# Patient Record
Sex: MALE | Race: WHITE | NOT HISPANIC OR LATINO | Employment: OTHER | URBAN - METROPOLITAN AREA
[De-identification: names, ages, dates, MRNs, and addresses within clinical notes are randomized per-mention and may not be internally consistent; named-entity substitution may affect disease eponyms.]

---

## 2019-02-22 ENCOUNTER — HOSPITAL ENCOUNTER (EMERGENCY)
Facility: HOSPITAL | Age: 44
Discharge: HOME | End: 2019-02-22
Attending: EMERGENCY MEDICINE
Payer: COMMERCIAL

## 2019-02-22 VITALS
BODY MASS INDEX: 29.66 KG/M2 | RESPIRATION RATE: 18 BRPM | SYSTOLIC BLOOD PRESSURE: 147 MMHG | HEIGHT: 67 IN | HEART RATE: 90 BPM | WEIGHT: 189 LBS | DIASTOLIC BLOOD PRESSURE: 90 MMHG | OXYGEN SATURATION: 96 % | TEMPERATURE: 98 F

## 2019-02-22 DIAGNOSIS — F43.0 ACUTE STRESS REACTION: Primary | ICD-10-CM

## 2019-02-22 PROCEDURE — 99283 EMERGENCY DEPT VISIT LOW MDM: CPT

## 2019-02-22 RX ORDER — BUSPIRONE HYDROCHLORIDE 15 MG/1
15 TABLET ORAL 3 TIMES DAILY
COMMUNITY
End: 2022-10-10

## 2019-02-22 RX ORDER — FLUTICASONE PROPIONATE 50 MCG
1 SPRAY, SUSPENSION (ML) NASAL DAILY
COMMUNITY

## 2019-02-22 NOTE — ED TRIAGE NOTES
"Pt arrived to ED via EMS after altercation with family today. Family reports that pt stated he wanted to kill himself. Pt does report stating he wanted to kill himself. He also reports suicidal thoughts occurring 4 days PTA. Pt denies SI at this time and denies a plan. He states "im am just stressed and overwhelmed". He also denies HI or any other medical complaints at this time.   "

## 2019-02-22 NOTE — DISCHARGE INSTRUCTIONS
Please follow up with counseling services as an outpatient and take all medications as prescribed.  Please return for any new or worsening symptoms such as thoughts of harming yourself, thoughts of harming others, or feelings of hopelessness.

## 2019-02-23 NOTE — ED PROVIDER NOTES
"Encounter Date: 2/22/2019    SCRIBE #1 NOTE: I, Debra Rome, am scribing for, and in the presence of,  Mateo Suarez MD. I have scribed the following portions of the note - Other sections scribed: ROS and HPI.       History     Chief Complaint   Patient presents with    Psychiatric Evaluation     Per EMs, pt was having argument with his girlfriend and mother and stated that he wanted to kill himself. Police were called and pt said the same thing to them. Upon arrival, pt denies SI     CC: Psychiatric Evaluation    HPI: This 43 y.o. male with a past medical history of Anxiety, presents to the ED for emergent psychiatric evaluation. EMS was called in by his girlfriend and mother after he reportedly made a statement to kill himself today. 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 He is having conflicts with his mother who he reports is not willing to support him financially. He also expresses severe anger towards her "alcoholic" sister because his mother is financially supporting his sister with her house mortgage. He reports culmination of things for last several months led to the outburst of his anger today.The patient reports he said to his mother, "Y'all will feel fucking stupid when you find me hanging on a tree." Denies any past hx of suicidal ideation. He currently denies SI/HI, hallucinations or any other sx. He takes Buspar for his generalized anxiety and reports compliance with it.       The history is provided by the patient. No  was used.     Review of patient's allergies indicates:  No Known Allergies  Past Medical History:   Diagnosis Date    Anxiety      History reviewed. No pertinent surgical history.  History reviewed. No pertinent family history.  Social History     Tobacco Use    Smoking status: Former Smoker    Smokeless tobacco: Never Used   Substance Use Topics    Alcohol use: No     Frequency: Never    Drug use: No "     Review of Systems   Constitutional: Negative for chills and fever.   HENT: Negative for congestion, ear pain, rhinorrhea and sore throat.    Eyes: Negative for pain and visual disturbance.   Respiratory: Negative for cough and shortness of breath.    Cardiovascular: Negative for chest pain.   Gastrointestinal: Negative for abdominal pain, diarrhea, nausea and vomiting.   Genitourinary: Negative for dysuria.   Musculoskeletal: Negative for back pain and neck pain.   Skin: Negative for rash.   Neurological: Negative for headaches.   Psychiatric/Behavioral: Negative for hallucinations and suicidal ideas.       Physical Exam     Initial Vitals [02/22/19 1634]   BP Pulse Resp Temp SpO2   (!) 140/96 90 18 98.2 °F (36.8 °C) 95 %      MAP       --         Physical Exam    Nursing note and vitals reviewed.  Constitutional: He appears well-developed and well-nourished. He is not diaphoretic. No distress.   HENT:   Head: Normocephalic and atraumatic.   Right Ear: External ear normal.   Left Ear: External ear normal.   Nose: Nose normal.   Mouth/Throat: Oropharynx is clear and moist.   Eyes: Conjunctivae and EOM are normal. Pupils are equal, round, and reactive to light. Right eye exhibits no discharge. Left eye exhibits no discharge. No scleral icterus.   Neck: Normal range of motion. Neck supple. No JVD present.   Cardiovascular: Normal rate, regular rhythm, normal heart sounds and intact distal pulses. Exam reveals no gallop and no friction rub.    No murmur heard.  Pulmonary/Chest: Breath sounds normal. No stridor. No respiratory distress. He has no wheezes. He has no rhonchi. He has no rales. He exhibits no tenderness.   Musculoskeletal: Normal range of motion. He exhibits no edema or tenderness.   Neurological: He is alert and oriented to person, place, and time. He has normal strength. No cranial nerve deficit or sensory deficit.   Skin: Skin is warm and dry. No rash noted. No erythema. No pallor.   Psychiatric: His  behavior is normal. Judgment and thought content normal.   This patient's thought process is linear.  His affect is animated.  His voice tone has normal range.  The patient is talkative but does not appear manic.  He does not appear delusional or psychotic.  He denies any suicidal ideation or homicidal ideation or attempts.         ED Course   Procedures  Labs Reviewed - No data to display       Imaging Results    None          Medical Decision Making:   ED Management:  This is the emergent evaluation of a 43-year-old male presents emergency department for psychiatric evaluation.      This appears to be a misunderstanding.  This patient denies any history of any psychiatric disease other than outpatient management of generalized anxiety disorder with buspirone that he has been on for some time.  He denies any drug or alcohol us.  Patient is expressing a lot of anger and frustration with situation and his family.  However, he has no thoughts of harming himself or plans to harm himself.  He has no thoughts of harming others.  He does not appear delusional or psychotic.  Patient states that he he had a fleeting thought of jumping into the river but was discussed with the idea afterwards and never imagine caring out this plan.  He denies any past attempts at harming himself or taking his life.  He also states that he made a comment today to his wife and his mother to get a reaction out of them regarding taking his life but states that he has no intention of doing so.  Patient is reasonable.  His affect is animated but otherwise is mental status exam is normal. I believe he is safe and stable for discharge with outpatient follow-up.  He was given resources for this.  He was advised to return if he does develop new or worsening symptoms such as loss of control, suicidal ideation, or homicidal ideation.  I do not believe that he needs to be on a 72 hr hold/PEC.            Scribe Attestation:   Scribe #1: I performed the  above scribed service and the documentation accurately describes the services I performed. I attest to the accuracy of the note.    Attending Attestation:           Physician Attestation for Scribe:  Physician Attestation Statement for Scribe #1: I, Mateo Suarez MD, reviewed documentation, as scribed by Debra Peter in my presence, and it is both accurate and complete.                    Clinical Impression:       ICD-10-CM ICD-9-CM   1. Acute stress reaction F43.0 308.9                                Mateo Suarze Jr., MD  02/22/19 2030

## 2021-04-16 ENCOUNTER — PATIENT MESSAGE (OUTPATIENT)
Dept: RESEARCH | Facility: HOSPITAL | Age: 46
End: 2021-04-16

## 2022-04-14 ENCOUNTER — OFFICE VISIT (OUTPATIENT)
Dept: PRIMARY CARE CLINIC | Facility: CLINIC | Age: 47
End: 2022-04-14
Payer: COMMERCIAL

## 2022-04-14 VITALS
DIASTOLIC BLOOD PRESSURE: 86 MMHG | HEIGHT: 67 IN | OXYGEN SATURATION: 99 % | BODY MASS INDEX: 28 KG/M2 | HEART RATE: 78 BPM | SYSTOLIC BLOOD PRESSURE: 138 MMHG | TEMPERATURE: 99 F | WEIGHT: 178.38 LBS

## 2022-04-14 DIAGNOSIS — B35.1 ONYCHOMYCOSIS: ICD-10-CM

## 2022-04-14 DIAGNOSIS — N40.0 BENIGN PROSTATIC HYPERPLASIA, UNSPECIFIED WHETHER LOWER URINARY TRACT SYMPTOMS PRESENT: ICD-10-CM

## 2022-04-14 DIAGNOSIS — F41.1 ANXIETY STATE: Primary | ICD-10-CM

## 2022-04-14 PROBLEM — J30.9 ALLERGIC RHINITIS: Status: ACTIVE | Noted: 2018-11-06

## 2022-04-14 PROCEDURE — 1159F MED LIST DOCD IN RCRD: CPT | Mod: CPTII,S$GLB,, | Performed by: INTERNAL MEDICINE

## 2022-04-14 PROCEDURE — 99999 PR PBB SHADOW E&M-EST. PATIENT-LVL III: ICD-10-PCS | Mod: PBBFAC,,, | Performed by: INTERNAL MEDICINE

## 2022-04-14 PROCEDURE — 1159F PR MEDICATION LIST DOCUMENTED IN MEDICAL RECORD: ICD-10-PCS | Mod: CPTII,S$GLB,, | Performed by: INTERNAL MEDICINE

## 2022-04-14 PROCEDURE — 3008F BODY MASS INDEX DOCD: CPT | Mod: CPTII,S$GLB,, | Performed by: INTERNAL MEDICINE

## 2022-04-14 PROCEDURE — 3079F PR MOST RECENT DIASTOLIC BLOOD PRESSURE 80-89 MM HG: ICD-10-PCS | Mod: CPTII,S$GLB,, | Performed by: INTERNAL MEDICINE

## 2022-04-14 PROCEDURE — 3079F DIAST BP 80-89 MM HG: CPT | Mod: CPTII,S$GLB,, | Performed by: INTERNAL MEDICINE

## 2022-04-14 PROCEDURE — 3075F PR MOST RECENT SYSTOLIC BLOOD PRESS GE 130-139MM HG: ICD-10-PCS | Mod: CPTII,S$GLB,, | Performed by: INTERNAL MEDICINE

## 2022-04-14 PROCEDURE — 99999 PR PBB SHADOW E&M-EST. PATIENT-LVL III: CPT | Mod: PBBFAC,,, | Performed by: INTERNAL MEDICINE

## 2022-04-14 PROCEDURE — 3075F SYST BP GE 130 - 139MM HG: CPT | Mod: CPTII,S$GLB,, | Performed by: INTERNAL MEDICINE

## 2022-04-14 PROCEDURE — 99214 OFFICE O/P EST MOD 30 MIN: CPT | Mod: S$GLB,,, | Performed by: INTERNAL MEDICINE

## 2022-04-14 PROCEDURE — 99214 PR OFFICE/OUTPT VISIT, EST, LEVL IV, 30-39 MIN: ICD-10-PCS | Mod: S$GLB,,, | Performed by: INTERNAL MEDICINE

## 2022-04-14 PROCEDURE — 3008F PR BODY MASS INDEX (BMI) DOCUMENTED: ICD-10-PCS | Mod: CPTII,S$GLB,, | Performed by: INTERNAL MEDICINE

## 2022-04-14 RX ORDER — ESCITALOPRAM OXALATE 10 MG/1
10 TABLET ORAL DAILY
COMMUNITY
Start: 2021-12-27 | End: 2022-04-14 | Stop reason: SDUPTHER

## 2022-04-14 RX ORDER — TERBINAFINE HYDROCHLORIDE 250 MG/1
250 TABLET ORAL DAILY
Qty: 42 TABLET | Refills: 0 | Status: SHIPPED | OUTPATIENT
Start: 2022-04-14 | End: 2022-05-26

## 2022-04-14 RX ORDER — TAMSULOSIN HYDROCHLORIDE 0.4 MG/1
0.4 CAPSULE ORAL DAILY
Qty: 30 CAPSULE | Refills: 5 | Status: SHIPPED | OUTPATIENT
Start: 2022-04-14 | End: 2022-10-06

## 2022-04-14 RX ORDER — ESCITALOPRAM OXALATE 10 MG/1
10 TABLET ORAL DAILY
Qty: 90 TABLET | Refills: 3 | Status: SHIPPED | OUTPATIENT
Start: 2022-04-14 | End: 2023-10-03

## 2022-04-14 NOTE — PROGRESS NOTES
[facility] Progress Note  4/14/2022          Reason for Visit:      had concerns including Follow-up, Medication Management, and Toe Pain.       History of Present Illness:     Pt is here today to reestablish primary care.  He is known to me from my previous practice.    Patient has been on Lexapro and BuSpar for anxiety for the past couple of years.  He says that these have been working very well for him.  He says that he still has some motor tics occasionally and he knows this some more when he is anxious and stressed out.  He recently ran out of Lexapro and he states that once he ran out of that he began having about 4 hours of dizziness that would happen whenever he took the BusPar.  He says that when he 1st started taking the BusPar without the Lexapro he will get dizziness for 10 or 15 minutes but this was lasting for hours or so.  It only started happening when he got off the Lexapro.  He would like to try getting back on the Lexapro.    Patient has a new complaint which is that over the past several months he has been having problems with urinary urgency.  He says when he does go to the bathroom sometimes it is difficult getting his stream started and often feels like he has not completely emptied his bladder.  He says he is having nocturia only 1 time per night but that he is going frequently to use the restroom during the day but he attributes that to increase fluid intake.    Patient also has thickening and discoloration of pretty much all of his toes.  He says it is only 2 sodium those that have been spared.  He says that this 1st began about 20 years ago.  He says it does not really bother him except that the great toe toenail on the right side is ingrowing into his skin and that is causing pain.  He would like to try treating the toenail fungus and we discussed options.  He is agreeable to try terbinafine.    We discussed that patient is due for colon cancer screening.  We are  going to put a referral in for colonoscopy but he says that because we may be out of network he needs to check with his insurance 1st.  He is going to let us know when and where we can send the referral to.    For the same reason he is also wanting to hold off on doing blood work today.  We did discuss that we do do blood work I would recommend doing a PSA because of his urinary symptoms.      Problem List:     Patient Active Problem List   Diagnosis    Allergic rhinitis    Anxiety state    Benign prostatic hyperplasia    Onychomycosis         Surgical History:     He has no past surgical history on file.      Family History:      His family history includes Epilepsy in his father.      Social History:     He reports that he has quit smoking. He has never used smokeless tobacco. He reports that he does not drink alcohol and does not use drugs.      Medications:       Current Outpatient Medications:     busPIRone (BUSPAR) 15 MG tablet, Take 15 mg by mouth 3 (three) times daily., Disp: , Rfl:     fluticasone (FLONASE) 50 mcg/actuation nasal spray, 1 spray by Each Nare route once daily., Disp: , Rfl:     levocetirizine dihydrochloride (XYZAL ORAL), Take by mouth., Disp: , Rfl:     EScitalopram oxalate (LEXAPRO) 10 MG tablet, Take 1 tablet (10 mg total) by mouth once daily., Disp: 90 tablet, Rfl: 3    tamsulosin (FLOMAX) 0.4 mg Cap, Take 1 capsule (0.4 mg total) by mouth once daily., Disp: 30 capsule, Rfl: 5    terbinafine HCL (LAMISIL) 250 mg tablet, Take 1 tablet (250 mg total) by mouth once daily., Disp: 42 tablet, Rfl: 0        Allergies:   He has No Known Allergies.      Review of Systems:     Review of Systems   Constitutional: Negative for chills and fever.   HENT: Negative for ear pain and sore throat.    Respiratory: Negative for cough, shortness of breath and wheezing.    Cardiovascular: Negative for chest pain and palpitations.   Gastrointestinal: Negative for constipation, diarrhea, nausea and  "vomiting.   Genitourinary: Positive for frequency and urgency. Negative for dysuria and hematuria.   Musculoskeletal: Negative for joint swelling and joint deformity.   Neurological: Negative for dizziness and weakness.           Physical Exam:     Temp:    99.1 °F (37.3 °C) (Oral)        Blood Pressure:  138/86        Pulse:   78     Respirations:     Weight:   80.9 kg (178 lb 5.6 oz)  Height:   5' 7" (1.702 m)  BMI:     Body mass index is 27.93 kg/m².    Physical Exam  Constitutional:       General: He is not in acute distress.  HENT:      Right Ear: Tympanic membrane normal.      Left Ear: Tympanic membrane normal.      Nose: No congestion or rhinorrhea.      Mouth/Throat:      Pharynx: No oropharyngeal exudate or posterior oropharyngeal erythema.   Cardiovascular:      Rate and Rhythm: Normal rate and regular rhythm.   Pulmonary:      Effort: Pulmonary effort is normal. No respiratory distress.      Breath sounds: No wheezing.   Abdominal:      Palpations: Abdomen is soft.      Tenderness: There is no abdominal tenderness.   Genitourinary:     Comments: Prostate mildly enlarged, symmetric, no nodules noted.   Skin:     General: Skin is warm.   Neurological:      General: No focal deficit present.      Mental Status: He is alert and oriented to person, place, and time.           Assessment and Plan:     1. Onychomycosis  Try terbinafine 250 daily x 6 weeks.  Advised that if not resolved after 6 weeks, may need additonal treatment but would need to do labs first.    2. Anxiety state  Refilled lexapro,  Continue buspirone which as been helping    3. Benign prostatic hyperplasia, unspecified whether lower urinary tract symptoms present  Prostate enlarged on exam today -   Start flomax  RTC if no improvement or worsening.  Recommended PSA, but he wants to wait on labs until he figures out insurance coverage    RTC 6 mos or sooner simeon Cedeño MD  4/14/2022    This note was prepared using " voice-recognition software.  Although efforts are made to proofread the note, some errors may persist in the final document.

## 2022-04-16 ENCOUNTER — PATIENT MESSAGE (OUTPATIENT)
Dept: PRIMARY CARE CLINIC | Facility: CLINIC | Age: 47
End: 2022-04-16
Payer: COMMERCIAL

## 2022-04-18 ENCOUNTER — PATIENT MESSAGE (OUTPATIENT)
Dept: ADMINISTRATIVE | Facility: HOSPITAL | Age: 47
End: 2022-04-18
Payer: COMMERCIAL

## 2022-05-23 DIAGNOSIS — B35.1 ONYCHOMYCOSIS: Primary | ICD-10-CM

## 2022-05-23 RX ORDER — TERBINAFINE HYDROCHLORIDE 250 MG/1
TABLET ORAL
Qty: 42 TABLET | Refills: 0 | OUTPATIENT
Start: 2022-05-23

## 2022-05-23 NOTE — TELEPHONE ENCOUNTER
We would need to do labs to check wbc and liver functions before continuing on another round of the terbinafine.  I put in orders - can you schedule the labs for him.

## 2023-09-03 DIAGNOSIS — F41.1 ANXIETY STATE: ICD-10-CM

## 2023-09-06 RX ORDER — BUSPIRONE HYDROCHLORIDE 15 MG/1
TABLET ORAL
Qty: 90 TABLET | Refills: 0 | Status: SHIPPED | OUTPATIENT
Start: 2023-09-06

## 2023-10-03 RX ORDER — ESCITALOPRAM OXALATE 10 MG/1
10 TABLET ORAL
Qty: 90 TABLET | Refills: 0 | Status: SHIPPED | OUTPATIENT
Start: 2023-10-03

## 2023-10-03 NOTE — TELEPHONE ENCOUNTER
No care due was identified.  [facility] Embedded Care Due Messages. Reference number: [id number].   10/03/2023 5:53:38 AM CDT